# Patient Record
Sex: FEMALE | Race: WHITE | Employment: STUDENT
[De-identification: names, ages, dates, MRNs, and addresses within clinical notes are randomized per-mention and may not be internally consistent; named-entity substitution may affect disease eponyms.]

---

## 2017-08-05 ENCOUNTER — HEALTH MAINTENANCE LETTER (OUTPATIENT)
Age: 22
End: 2017-08-05

## 2019-10-21 ENCOUNTER — OFFICE VISIT (OUTPATIENT)
Dept: FAMILY MEDICINE | Facility: CLINIC | Age: 24
End: 2019-10-21
Payer: COMMERCIAL

## 2019-10-21 VITALS
WEIGHT: 153.75 LBS | HEART RATE: 95 BPM | HEIGHT: 67 IN | DIASTOLIC BLOOD PRESSURE: 84 MMHG | SYSTOLIC BLOOD PRESSURE: 120 MMHG | TEMPERATURE: 97.8 F | OXYGEN SATURATION: 97 % | BODY MASS INDEX: 24.13 KG/M2

## 2019-10-21 DIAGNOSIS — H10.33 ACUTE CONJUNCTIVITIS OF BOTH EYES, UNSPECIFIED ACUTE CONJUNCTIVITIS TYPE: ICD-10-CM

## 2019-10-21 DIAGNOSIS — Z11.3 ROUTINE SCREENING FOR STI (SEXUALLY TRANSMITTED INFECTION): ICD-10-CM

## 2019-10-21 DIAGNOSIS — J01.90 ACUTE SINUSITIS, RECURRENCE NOT SPECIFIED, UNSPECIFIED LOCATION: Primary | ICD-10-CM

## 2019-10-21 PROCEDURE — 86706 HEP B SURFACE ANTIBODY: CPT | Performed by: FAMILY MEDICINE

## 2019-10-21 PROCEDURE — 87591 N.GONORRHOEAE DNA AMP PROB: CPT | Performed by: FAMILY MEDICINE

## 2019-10-21 PROCEDURE — 87491 CHLMYD TRACH DNA AMP PROBE: CPT | Performed by: FAMILY MEDICINE

## 2019-10-21 PROCEDURE — 86780 TREPONEMA PALLIDUM: CPT | Performed by: FAMILY MEDICINE

## 2019-10-21 PROCEDURE — 87340 HEPATITIS B SURFACE AG IA: CPT | Performed by: FAMILY MEDICINE

## 2019-10-21 PROCEDURE — 99203 OFFICE O/P NEW LOW 30 MIN: CPT | Performed by: FAMILY MEDICINE

## 2019-10-21 PROCEDURE — 36415 COLL VENOUS BLD VENIPUNCTURE: CPT | Performed by: FAMILY MEDICINE

## 2019-10-21 PROCEDURE — 87389 HIV-1 AG W/HIV-1&-2 AB AG IA: CPT | Performed by: FAMILY MEDICINE

## 2019-10-21 RX ORDER — POLYMYXIN B SULFATE AND TRIMETHOPRIM 1; 10000 MG/ML; [USP'U]/ML
1 SOLUTION OPHTHALMIC EVERY 4 HOURS
Qty: 1 BOTTLE | Refills: 0 | Status: SHIPPED | OUTPATIENT
Start: 2019-10-21 | End: 2019-10-28

## 2019-10-21 ASSESSMENT — PATIENT HEALTH QUESTIONNAIRE - PHQ9
10. IF YOU CHECKED OFF ANY PROBLEMS, HOW DIFFICULT HAVE THESE PROBLEMS MADE IT FOR YOU TO DO YOUR WORK, TAKE CARE OF THINGS AT HOME, OR GET ALONG WITH OTHER PEOPLE: NOT DIFFICULT AT ALL
SUM OF ALL RESPONSES TO PHQ QUESTIONS 1-9: 2
SUM OF ALL RESPONSES TO PHQ QUESTIONS 1-9: 2

## 2019-10-21 ASSESSMENT — ANXIETY QUESTIONNAIRES
GAD7 TOTAL SCORE: 0
6. BECOMING EASILY ANNOYED OR IRRITABLE: NOT AT ALL
7. FEELING AFRAID AS IF SOMETHING AWFUL MIGHT HAPPEN: NOT AT ALL
2. NOT BEING ABLE TO STOP OR CONTROL WORRYING: NOT AT ALL
3. WORRYING TOO MUCH ABOUT DIFFERENT THINGS: NOT AT ALL
1. FEELING NERVOUS, ANXIOUS, OR ON EDGE: NOT AT ALL
GAD7 TOTAL SCORE: 0
GAD7 TOTAL SCORE: 0
7. FEELING AFRAID AS IF SOMETHING AWFUL MIGHT HAPPEN: NOT AT ALL
5. BEING SO RESTLESS THAT IT IS HARD TO SIT STILL: NOT AT ALL
4. TROUBLE RELAXING: NOT AT ALL

## 2019-10-21 ASSESSMENT — MIFFLIN-ST. JEOR: SCORE: 1467.1

## 2019-10-21 NOTE — PROGRESS NOTES
SUBJECTIVE:   Adeline Givens is a 24 year old female who presents to clinic today for the following health issues:    Answers for HPI/ROS submitted by the patient on 10/21/2019   If you checked off any problems, how difficult have these problems made it for you to do your work, take care of things at home, or get along with other people?: Not difficult at all  PHQ9 TOTAL SCORE: 2  SHELBI 7 TOTAL SCORE: 0      Acute Illness   Acute illness concerns: uri  Onset: 1 week ago     Fever: no     Chills/Sweats: no     Headache (location?): YES    Sinus Pressure:YES    Conjunctivitis:  YES: bilateral but mostly her left .  Denies any eye pain.  Has tenderness at the lateral lid.  Denies vision changes. No photophobia.     Ear Pain: no    Rhinorrhea: YES    Congestion: YES    Sore Throat: YES- mostly due to the coughing      Cough: YES    Wheeze: No    Decreased Appetite: no     Nausea: YES    Vomiting: no     Diarrhea:  no     Dysuria/Freq.: no     Fatigue/Achiness: no     Sick/Strep Exposure: YES- both of her parents and mother was on antibiotics      Therapies Tried and outcome: sudafed, she took one this morning    Denies any rashes.  Denies sore throat or fevers.  Feels like sinus symptoms have become progressively worse.  Has history of sinus infections.  She has to fly to [place] tomorrow   She also has a new partner and was wondering if she could get a test on GC.  Did not use a condom.    She has not been seen in clinic since 2015.           Problem list and histories reviewed & adjusted, as indicated.  Additional history: as documented    Patient Active Problem List   Diagnosis     Dysmenorrhea     Past Surgical History:   Procedure Laterality Date     NO HISTORY OF SURGERY         Social History     Tobacco Use     Smoking status: Never Smoker     Smokeless tobacco: Never Used   Substance Use Topics     Alcohol use: Yes     Comment: limited     Family History   Problem Relation Age of Onset     Depression Maternal  "Grandfather      Diabetes Paternal Grandmother      Depression Maternal Grandmother      Depression Cousin      Depression Cousin      Asthma No family hx of      C.A.D. No family hx of      Hypertension No family hx of      Cerebrovascular Disease No family hx of      Breast Cancer No family hx of      Cancer - colorectal No family hx of          Current Outpatient Medications   Medication Sig Dispense Refill     amoxicillin-clavulanate (AUGMENTIN) 875-125 MG tablet Take 1 tablet by mouth 2 times daily for 7 days 14 tablet 0     ibuprofen (IBU) 800 MG tablet Take 1 tablet by mouth 3 times daily as needed. 100 tablet PRN     levonorgestrel (MIRENA) 20 MCG/24HR IUD 1 Intra Uterine Device by Intrauterine route       trimethoprim-polymyxin b (POLYTRIM) 41770-4.1 UNIT/ML-% ophthalmic solution Place 1 drop into both eyes every 4 hours for 7 days 1 Bottle 0     buPROPion (WELLBUTRIN XL) 300 MG 24 hr tablet Take 1 tablet (300 mg) by mouth every morning 90 tablet 0     desogestrel-ethinyl estradiol (APRI) 0.15-30 MG-MCG per tablet Take 1 tablet by mouth daily 84 tablet 0     Allergies   Allergen Reactions     Nkda [No Known Drug Allergies]      No lab results found.   BP Readings from Last 3 Encounters:   10/21/19 120/84   11/27/15 94/60   10/09/15 120/62    Wt Readings from Last 3 Encounters:   10/21/19 69.7 kg (153 lb 12 oz)   11/27/15 67.6 kg (149 lb)   10/09/15 66.4 kg (146 lb 6.4 oz)              Reviewed and updated as needed this visit by clinical staff  Tobacco  Allergies  Meds       Reviewed and updated as needed this visit by Provider         ROS:  As above    OBJECTIVE:     /84 (BP Location: Right arm, Patient Position: Sitting, Cuff Size: Adult Regular)   Pulse 95   Temp 97.8  F (36.6  C) (Oral)   Ht 1.689 m (5' 6.5\")   Wt 69.7 kg (153 lb 12 oz)   LMP 10/04/2019   SpO2 97%   BMI 24.44 kg/m    Body mass index is 24.44 kg/m .    Exam:  GENERAL APPEARANCE:  alert and no acute " distress  EYES:anicteric, no conjunctival redness  HENT: ear canals and TM's normal and nose and mouth without ulcers or lesions; OP mildly erythematous without swelling or exudates  NECK: supple, nontender  RESP: lungs clear to auscultation - no rales, rhonchi or wheezes  CV: regular rates and rhythm, normal S1 S2, no S3 or S4 and no murmur, click or rub -     Diagnostic Test Results:  none     ASSESSMENT/PLAN:            ICD-10-CM    1. Acute sinusitis, recurrence not specified, unspecified location J01.90 amoxicillin-clavulanate (AUGMENTIN) 875-125 MG tablet   2. Acute conjunctivitis of both eyes, unspecified acute conjunctivitis type H10.33 trimethoprim-polymyxin b (POLYTRIM) 30774-5.1 UNIT/ML-% ophthalmic solution   3. Routine screening for STI (sexually transmitted infection) Z11.3 NEISSERIA GONORRHOEA PCR     CHLAMYDIA TRACHOMATIS PCR     HIV Antigen Antibody Combo     Treponema Abs w Reflex to RPR and Titer     Hepatitis B Surface Antibody     Hepatitis B surface antigen     See below.   Recommended condom use to prevent STI.     Patient Instructions   Information from Your Family Doctor  Treating the Common Cold in Adults  Am [facility] Physician. 2012 Jul 15;86(2):online.related article on treatment of the common cold in children and adults.  What do I do if I have a cold?  Most colds don't cause serious illness and will get better over time. Cold symptoms in adults can be treated with some over-the-counter medicines. Talk to your doctor about what is best for you.  What over-the-counter treatments are helpful in adults?  ? Choosing an over-the-counter medicine that contains an antihistamine and a decongestant may help you cough less and breath better through your nose. Cough medicines such as dextromethorphan (one brand: Robitussin) and guaifenesin (one brand: Mucinex) may help some people.  ? If you have a headache or body aches, pain medicines such as ibuprofen (one brand: Advil) can help. The pain medicine  naproxen (one brand: Aleve) also may be used for cough.  ? Herbal products, such as Echinacea purpurea, Pelargonium sidoides (geranium) extract (one brand: Umcka Coldcare), and Andrographis paniculata (one brand: Kalmcold), may reduce cold symptoms.  ? Zinc taken in the first 24 hours of cold symptoms may reduce how many days you have a cold, and you may also get fewer symptoms. You can take one lozenge every two hours while awake for as long as you have cold symptoms. But, they may give you a bad taste in your mouth or upset your stomach. Zinc nose sprays should not be used.  What treatments are not helpful in adults?  ? Antibiotics  ? Antihistamines without decongestants  ? Codeine  ? Echinacea angustifolia  ? Saline nasal spray  ? Vitamin C  This handout is provided to you by your family doctor and the American Academy [employer]. Other health-related information is available from the AAFP online at [email or web address]. ??This information provides a general overview and may not apply to everyone. Talk to your family doctor to find out if this information applies to you and to get more information on this subject.  2012 by the American Academy [employer] Family [employer].?This content is owned by the AAFP. A person viewing it online may make one printout of the material and may use that printout only for his or her personal, non-commercial reference. This material may not otherwise be downloaded, copied, printed, stored, transmitted or reproduced in any medium, whether now known or later invented, except as authorized in writing by the AAFP. Contact [email or web address] for copyright questions and/or permission requests.    1) For your pink eye (conjunctivitis) you can start the eye drops one drop each eye every 4 hours for 7 days.   2) If your sinus symptoms are not improving over the next few days, then you can start the antibiotic (augmentin) for 7 days.           Yuni Champagne M.D.        [facility]  [place]

## 2019-10-21 NOTE — PATIENT INSTRUCTIONS
Information from Your Family Doctor  Treating the Common Cold in Adults  Am [facility] Physician. 2012 Jul 15;86(2):online.related article on treatment of the common cold in children and adults.  What do I do if I have a cold?  Most colds don't cause serious illness and will get better over time. Cold symptoms in adults can be treated with some over-the-counter medicines. Talk to your doctor about what is best for you.  What over-the-counter treatments are helpful in adults?  ? Choosing an over-the-counter medicine that contains an antihistamine and a decongestant may help you cough less and breath better through your nose. Cough medicines such as dextromethorphan (one brand: Robitussin) and guaifenesin (one brand: Mucinex) may help some people.  ? If you have a headache or body aches, pain medicines such as ibuprofen (one brand: Advil) can help. The pain medicine naproxen (one brand: Aleve) also may be used for cough.  ? Herbal products, such as Echinacea purpurea, Pelargonium sidoides (geranium) extract (one brand: Umcka Coldcare), and Andrographis paniculata (one brand: Kalmcold), may reduce cold symptoms.  ? Zinc taken in the first 24 hours of cold symptoms may reduce how many days you have a cold, and you may also get fewer symptoms. You can take one lozenge every two hours while awake for as long as you have cold symptoms. But, they may give you a bad taste in your mouth or upset your stomach. Zinc nose sprays should not be used.  What treatments are not helpful in adults?  ? Antibiotics  ? Antihistamines without decongestants  ? Codeine  ? Echinacea angustifolia  ? Saline nasal spray  ? Vitamin C  This handout is provided to you by your family doctor and the American Academy [employer] Family [employer]icians. Other health-related information is available from the AAFP online at [email or web address]. ??This information provides a general overview and may not apply to everyone. Talk to your family doctor to find out if this  information applies to you and to get more information on this subject.  2012 by the American Academy [employer]icians.?This content is owned by the AA. A person viewing it online may make one printout of the material and may use that printout only for his or her personal, non-commercial reference. This material may not otherwise be downloaded, copied, printed, stored, transmitted or reproduced in any medium, whether now known or later invented, except as authorized in writing by the AAFP. Contact [email or web address] for copyright questions and/or permission requests.    1) For your pink eye (conjunctivitis) you can start the eye drops one drop each eye every 4 hours for 7 days.   2) If your sinus symptoms are not improving over the next few days, then you can start the antibiotic (augmentin) for 7 days.

## 2019-10-22 LAB
C TRACH DNA SPEC QL NAA+PROBE: NEGATIVE
HBV SURFACE AB SERPL IA-ACNC: 1.34 M[IU]/ML
HBV SURFACE AG SERPL QL IA: NONREACTIVE
HIV 1+2 AB+HIV1 P24 AG SERPL QL IA: NONREACTIVE
N GONORRHOEA DNA SPEC QL NAA+PROBE: NEGATIVE
SPECIMEN SOURCE: NORMAL
SPECIMEN SOURCE: NORMAL

## 2019-10-22 ASSESSMENT — ANXIETY QUESTIONNAIRES: GAD7 TOTAL SCORE: 0

## 2019-10-22 ASSESSMENT — PATIENT HEALTH QUESTIONNAIRE - PHQ9: SUM OF ALL RESPONSES TO PHQ QUESTIONS 1-9: 2

## 2019-10-23 LAB — T PALLIDUM AB SER QL: NONREACTIVE

## 2019-10-23 NOTE — RESULT ENCOUNTER NOTE
Excellent! Please call or sent a [employer] message if you have any questions. Yuni Champagne M.D.

## 2019-11-05 ENCOUNTER — HEALTH MAINTENANCE LETTER (OUTPATIENT)
Age: 24
End: 2019-11-05

## 2020-02-06 ENCOUNTER — MYC MEDICAL ADVICE (OUTPATIENT)
Dept: FAMILY MEDICINE | Facility: CLINIC | Age: 25
End: 2020-02-06

## 2020-02-06 NOTE — TELEPHONE ENCOUNTER
Two weeks for most STIs (like Chlamydia) but could be up to two months for HIV.  She could come get tested after 2 weeks and then get another HIV test a few months later.

## 2020-02-06 NOTE — TELEPHONE ENCOUNTER
Routing to provider - Elke - please review and advise as appropriate    Patient would like to know when appointment would be appropriate for STD testing due to concern for incubation period    Last sexual intercourse one week ago - used protection - reporting no symptoms - states partner cheated

## 2020-02-18 ENCOUNTER — OFFICE VISIT (OUTPATIENT)
Dept: FAMILY MEDICINE | Facility: CLINIC | Age: 25
End: 2020-02-18
Payer: COMMERCIAL

## 2020-02-18 VITALS
OXYGEN SATURATION: 99 % | HEIGHT: 66 IN | BODY MASS INDEX: 25.23 KG/M2 | HEART RATE: 80 BPM | TEMPERATURE: 98 F | DIASTOLIC BLOOD PRESSURE: 72 MMHG | SYSTOLIC BLOOD PRESSURE: 118 MMHG | WEIGHT: 157 LBS

## 2020-02-18 DIAGNOSIS — Z11.3 SCREEN FOR STD (SEXUALLY TRANSMITTED DISEASE): Primary | ICD-10-CM

## 2020-02-18 DIAGNOSIS — Z12.4 SCREENING FOR CERVICAL CANCER: ICD-10-CM

## 2020-02-18 LAB
SPECIMEN SOURCE: ABNORMAL
WET PREP SPEC: ABNORMAL

## 2020-02-18 PROCEDURE — 86780 TREPONEMA PALLIDUM: CPT | Performed by: NURSE PRACTITIONER

## 2020-02-18 PROCEDURE — 87210 SMEAR WET MOUNT SALINE/INK: CPT | Performed by: NURSE PRACTITIONER

## 2020-02-18 PROCEDURE — 36415 COLL VENOUS BLD VENIPUNCTURE: CPT | Performed by: NURSE PRACTITIONER

## 2020-02-18 PROCEDURE — 87491 CHLMYD TRACH DNA AMP PROBE: CPT | Performed by: NURSE PRACTITIONER

## 2020-02-18 PROCEDURE — 99213 OFFICE O/P EST LOW 20 MIN: CPT | Performed by: NURSE PRACTITIONER

## 2020-02-18 PROCEDURE — G0145 SCR C/V CYTO,THINLAYER,RESCR: HCPCS | Performed by: NURSE PRACTITIONER

## 2020-02-18 PROCEDURE — 87340 HEPATITIS B SURFACE AG IA: CPT | Performed by: NURSE PRACTITIONER

## 2020-02-18 PROCEDURE — 87389 HIV-1 AG W/HIV-1&-2 AB AG IA: CPT | Performed by: NURSE PRACTITIONER

## 2020-02-18 PROCEDURE — 86803 HEPATITIS C AB TEST: CPT | Performed by: NURSE PRACTITIONER

## 2020-02-18 PROCEDURE — 87591 N.GONORRHOEAE DNA AMP PROB: CPT | Performed by: NURSE PRACTITIONER

## 2020-02-18 ASSESSMENT — MIFFLIN-ST. JEOR: SCORE: 1473.9

## 2020-02-18 NOTE — PROGRESS NOTES
"Subjective     Adeline Givens is a 24 year old female who presents to clinic today for the following health issues:    HPI     STD Check   HIV as well if it the right time for it     She had unprotected sex with an ex-boyfriend about 3 weeks ago and would like STI testing.  She denies any symptoms.     She is due for a pap smear.             Reviewed and updated as needed this visit by Provider         Review of Systems   ROS COMP: CONSTITUTIONAL: NEGATIVE for fever, chills, change in weight  ENT/MOUTH: NEGATIVE for ear, mouth and throat problems  RESP: NEGATIVE for significant cough or SOB  CV: NEGATIVE for chest pain, palpitations or peripheral edema  GI: NEGATIVE for nausea, abdominal pain, heartburn, or change in bowel habits  : negative for vaginal discharge  PSYCHIATRIC: NEGATIVE for changes in mood or affect      Objective    /72   Pulse 80   Temp 98  F (36.7  C) (Oral)   Ht 1.676 m (5' 6\")   Wt 71.2 kg (157 lb)   LMP 02/12/2020 (Exact Date)   SpO2 99%   BMI 25.34 kg/m    Body mass index is 25.34 kg/m .  Physical Exam   GENERAL: healthy, alert and no distress  RESP: lungs clear to auscultation - no rales, rhonchi or wheezes  CV: regular rate and rhythm, normal S1 S2, no S3 or S4, no murmur, click or rub, no peripheral edema and peripheral pulses strong   (female): normal female external genitalia, normal urethral meatus, vaginal mucosa, normal cervix/adnexa/uterus without masses or discharge  PSYCH: mentation appears normal, affect normal/bright    Diagnostic Test Results:  Labs reviewed in Epic  Results for orders placed or performed in visit on 02/18/20 (from the past 24 hour(s))   Wet prep   Result Value Ref Range    Specimen Description Vagina     Wet Prep No Trichomonas seen     Wet Prep Few  Clue cells seen   (A)     Wet Prep No yeast seen     Wet Prep Few  WBC'S seen              Assessment & Plan     (Z11.3) Screen for STD (sexually transmitted disease)  (primary encounter " "diagnosis)  Comment:   Plan: Treponema Abs w Reflex to RPR and Titer, HIV         Antigen Antibody Combo, Hepatitis B surface         antigen, Hepatitis C antibody, **HIV Antigen         Antibody Combo FUTURE anytime, NEISSERIA         GONORRHOEA PCR, CHLAMYDIA TRACHOMATIS PCR, Wet         prep, CANCELED: NEISSERIA GONORRHOEA PCR,         CANCELED: CHLAMYDIA TRACHOMATIS PCR, CANCELED:         Trichomonas vaginalis DNA PCR            (Z12.4) Screening for cervical cancer  Comment:   Plan: Pap imaged thin layer screen reflex to HPV if         ASCUS - recommend age 25 - 29               BMI:   Estimated body mass index is 25.34 kg/m  as calculated from the following:    Height as of this encounter: 1.676 m (5' 6\").    Weight as of this encounter: 71.2 kg (157 lb).               No follow-ups on file.    Tierra Bedoya, ADAM  [facility]        "

## 2020-02-19 LAB
C TRACH DNA SPEC QL NAA+PROBE: NEGATIVE
HBV SURFACE AG SERPL QL IA: NONREACTIVE
HCV AB SERPL QL IA: NONREACTIVE
HIV 1+2 AB+HIV1 P24 AG SERPL QL IA: NONREACTIVE
N GONORRHOEA DNA SPEC QL NAA+PROBE: NEGATIVE
SPECIMEN SOURCE: NORMAL
SPECIMEN SOURCE: NORMAL
T PALLIDUM AB SER QL: NONREACTIVE

## 2020-02-20 LAB
COPATH REPORT: NORMAL
PAP: NORMAL

## 2020-11-22 ENCOUNTER — HEALTH MAINTENANCE LETTER (OUTPATIENT)
Age: 25
End: 2020-11-22

## 2021-09-19 ENCOUNTER — HEALTH MAINTENANCE LETTER (OUTPATIENT)
Age: 26
End: 2021-09-19

## 2022-01-09 ENCOUNTER — HEALTH MAINTENANCE LETTER (OUTPATIENT)
Age: 27
End: 2022-01-09

## 2022-11-20 ENCOUNTER — HEALTH MAINTENANCE LETTER (OUTPATIENT)
Age: 27
End: 2022-11-20

## 2023-04-15 ENCOUNTER — HEALTH MAINTENANCE LETTER (OUTPATIENT)
Age: 28
End: 2023-04-15